# Patient Record
Sex: FEMALE
[De-identification: names, ages, dates, MRNs, and addresses within clinical notes are randomized per-mention and may not be internally consistent; named-entity substitution may affect disease eponyms.]

---

## 2023-03-23 PROBLEM — Z00.00 ENCOUNTER FOR PREVENTIVE HEALTH EXAMINATION: Status: ACTIVE | Noted: 2023-03-23

## 2023-03-24 ENCOUNTER — APPOINTMENT (OUTPATIENT)
Dept: PLASTIC SURGERY | Facility: CLINIC | Age: 28
End: 2023-03-24
Payer: MEDICAID

## 2023-03-24 VITALS — BODY MASS INDEX: 23.54 KG/M2 | HEIGHT: 67 IN | WEIGHT: 150 LBS

## 2023-03-24 DIAGNOSIS — L90.5 SCAR CONDITIONS AND FIBROSIS OF SKIN: ICD-10-CM

## 2023-03-24 DIAGNOSIS — Z78.9 OTHER SPECIFIED HEALTH STATUS: ICD-10-CM

## 2023-03-24 PROCEDURE — 99203 OFFICE O/P NEW LOW 30 MIN: CPT

## 2023-03-24 NOTE — ASSESSMENT
[FreeTextEntry1] : 28 yo F with right glabellar scar deformity\par \par -Recommend right glabellar scar revision (wide awake) office\par \par -Benefits, risks and alternatives of the procedure were discussed. The risks include but not limited to bleeding, infection, poor wound healing and scarring, possible keloid, and need for re-operation. Location of scar and expected post-surgical outcomes were discussed. The patient understands the risks and would like to proceed with the office surgery.\par -All questions were answered\par -Informed consent was obtained.\par -Recommend procedure after Ramadan observance (non-fasting period).\par \par Photos were taken with patient permission.

## 2023-03-24 NOTE — PHYSICAL EXAM
[de-identified] : Well developed well nourished in NAD [de-identified] : atraumatic [de-identified] : RHONDAs [de-identified] : Non labored [de-identified] : right medial eyebrow with 2cm curvilinear well healed, widened (3mm), depressed scar with worsening creased on glabellar animation

## 2023-03-24 NOTE — HISTORY OF PRESENT ILLNESS
[FreeTextEntry1] : 28 yo F with no significant PMH who presents for evaluation of glabellar scar. Pt sustained lac to the area age 4 years, never sutured. Pt denies any issues, however appearance bothers her. c/o scar widening.  Pt has never tried any topical ointments or other interventions to improve scar appearance. 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

## 2023-08-28 ENCOUNTER — APPOINTMENT (OUTPATIENT)
Dept: PLASTIC SURGERY | Facility: CLINIC | Age: 28
End: 2023-08-28

## 2024-10-26 ENCOUNTER — NON-APPOINTMENT (OUTPATIENT)
Age: 29
End: 2024-10-26

## 2025-02-22 ENCOUNTER — NON-APPOINTMENT (OUTPATIENT)
Age: 30
End: 2025-02-22